# Patient Record
Sex: FEMALE | Race: WHITE | NOT HISPANIC OR LATINO | URBAN - METROPOLITAN AREA
[De-identification: names, ages, dates, MRNs, and addresses within clinical notes are randomized per-mention and may not be internally consistent; named-entity substitution may affect disease eponyms.]

---

## 2019-09-07 ENCOUNTER — EMERGENCY (EMERGENCY)
Facility: HOSPITAL | Age: 54
LOS: 0 days | Discharge: HOME | End: 2019-09-07
Attending: EMERGENCY MEDICINE | Admitting: EMERGENCY MEDICINE
Payer: COMMERCIAL

## 2019-09-07 VITALS
RESPIRATION RATE: 20 BRPM | DIASTOLIC BLOOD PRESSURE: 87 MMHG | TEMPERATURE: 98 F | OXYGEN SATURATION: 97 % | HEART RATE: 91 BPM | SYSTOLIC BLOOD PRESSURE: 164 MMHG

## 2019-09-07 DIAGNOSIS — R09.89 OTHER SPECIFIED SYMPTOMS AND SIGNS INVOLVING THE CIRCULATORY AND RESPIRATORY SYSTEMS: ICD-10-CM

## 2019-09-07 DIAGNOSIS — K21.9 GASTRO-ESOPHAGEAL REFLUX DISEASE WITHOUT ESOPHAGITIS: ICD-10-CM

## 2019-09-07 DIAGNOSIS — R07.0 PAIN IN THROAT: ICD-10-CM

## 2019-09-07 DIAGNOSIS — I10 ESSENTIAL (PRIMARY) HYPERTENSION: ICD-10-CM

## 2019-09-07 PROCEDURE — 99282 EMERGENCY DEPT VISIT SF MDM: CPT

## 2019-09-07 NOTE — ED PROVIDER NOTE - PATIENT PORTAL LINK FT
You can access the FollowMyHealth Patient Portal offered by [facility] by registering at the following website: [email or web address]. By joining [employer]’s FollowMyHealth portal, you will also be able to view your health information using other applications (apps) compatible with our system.

## 2019-09-07 NOTE — ED PROVIDER NOTE - CLINICAL SUMMARY MEDICAL DECISION MAKING FREE TEXT BOX
Patient presented with foreign body sensation in her throat. tolerating po, no difficulty breathing. ENT evaluated patient and had normal scope. Discharged with pmd follow up. Return precautions discussed.

## 2019-09-07 NOTE — CONSULT NOTE ADULT - SUBJECTIVE AND OBJECTIVE BOX
Pt is a 54 y.o female who presented with FB sensation. PT states she was eating breakfast sausage this morning, was talking to her son and noted the food went in the "wrong way". Felt like she was choking, but then resolved. PT states she was breathing fine, just noticed her voice was a little different - pt thought she scratched her throat. Pt was able to drink easily afterwards. PT went out for a walk and all of a sudden felt something "move" in her throat and felt her throat close up. PT currently denies any SOB/diff breathing. Pt states she has had to clear her throat multiple times, no resolution of symptoms. Does not feel sensation every time she swallows.    PAST MEDICAL & SURGICAL HISTORY:  Epilepsy  GERD (gastroesophageal reflux disease)  Hypertension  Home Medications:  Allergies    No Known Allergies    Intolerances    Vital Signs Last 24 Hrs  T(C): 36.4 (07 Sep 2019 15:34), Max: 36.4 (07 Sep 2019 15:34)  T(F): 97.5 (07 Sep 2019 15:34), Max: 97.5 (07 Sep 2019 15:34)  HR: 91 (07 Sep 2019 15:34) (91 - 91)  BP: 164/87 (07 Sep 2019 15:34) (164/87 - 164/87)  BP(mean): --  RR: 20 (07 Sep 2019 15:34) (20 - 20)  SpO2: 97% (07 Sep 2019 15:34) (97% - 97%)    GEN: NAD, awake and alert. no drooling or pooling of secretions. no respiratory distress, no stridor or stertor. good vocal quality.  HEENT: NC/AT; oral mucosa pink. no erythema/edema to post pharynx, uvula midline. neck supple.     Fiberoptic Laryngoscopy: airway patent, diffuse non specific erythema. no FB noted. TVC mobile and intact.

## 2019-09-07 NOTE — ED ADULT TRIAGE NOTE - CHIEF COMPLAINT QUOTE
pt c/o eating sausage this morning, feels like it is still stuck in her throat. Pt denies SOB or drooling. Pt speaking in full sentences.

## 2019-09-07 NOTE — ED ADULT TRIAGE NOTE - MEANS OF ARRIVAL
Per provider patient picked up Pharmacy Assistance Program medication. Medication: Tribenzor 40/10/25 mg (90 tablets)  : [employer]   Lot  [id number]         Exp 05/2019    Medication: Lantus 5mL prefilled pens 100 units/mL (10 pens)  : [employer]     Lot  [id number]         Exp 04/2020    Medication: Trulicity 1.05UR/5.6YV (20 pens)  [employer]   Lot  [id number]         Exp 02/2019    Patient verified understanding of medication and directions. Discharge instructions reviewed with patient. Medication list and understanding of medications reviewed with patient. OTC and herbal medications reviewed and added to med list if applicable  Barriers to adherence assessed.     Guidance given regarding new medications this visit, including reason for taking this medicine, and common side effects
ambulatory

## 2019-09-07 NOTE — ED ADULT NURSE NOTE - OBJECTIVE STATEMENT
c/o "I was eating sausage this morning and I choked on a piece. I feel like something is stuck in my throat." no fever/ chills/ sore throat

## 2019-09-07 NOTE — ED PROVIDER NOTE - CARE PROVIDERS DIRECT ADDRESSES
[email or web address]

## 2019-09-07 NOTE — ED PROVIDER NOTE - CARE PROVIDER_API CALL
Iftikhar Reyna)  Otolaryngology  [address] [place] [address], [address] [place] [address]  Phone: [phone]  Fax: [phone]  Follow Up Time:

## 2019-09-07 NOTE — ED PROVIDER NOTE - ATTENDING CONTRIBUTION TO CARE
53 yo F pmh of gerd, htn, epilepsy presents after episodes of chocking. State that she was eating sausage this morning and felt like she was chocking. Sensation resolved but felt like something was still in her throat. Went for a walk, then had an episode like she felt her throat spasm so came to the ed. No throat swelling, no trouble swallowing, able to tolerate drinking fluids.     CONSTITUTIONAL: Well-developed; well-nourished; in no acute distress.   SKIN: warm, dry  HEAD: Normocephalic; atraumatic.  EYES: PERRL, EOMI, no conjunctival erythema  ENT: No nasal discharge; airway clear. no pharyngeal erythema or edema.   NECK: Supple; non tender.  CARD: S1, S2 normal;  Regular rate and rhythm.   RESP: No wheezes, rales or rhonchi.

## 2019-09-07 NOTE — ED PROVIDER NOTE - OBJECTIVE STATEMENT
53 y/o female with hx HTN, GERD, Epilepsy presents to the ED c/o "I was eating sausage this morning and I choked on a piece. I feel like something is stuck in my throat." no fever/ chills/ sore throat

## 2019-09-07 NOTE — ED PROVIDER NOTE - NSFOLLOWUPINSTRUCTIONS_ED_ALL_ED_FT
Swallowed Foreign Body, Adult    A swallowed foreign body means that you swallow something and it gets stuck. It might be food or something else. The object may get stuck in the tube that connects your throat to your stomach (esophagus), or it may get stuck in another part of your belly (digestive tract). It is very important to tell your doctor what you have swallowed.    Sometimes, the object will pass through your body on its own. Sometimes, the object will pass after you are given a medicine to relax your throat. The object may need to be taken out by a doctor if it is dangerous or if it will not pass through your body on its own. An object may need to be removed with surgery if:  It gets stuck in your throat.  You cannot swallow.  You cannot breathe well.  It is sharp.  It is harmful or poisonous (toxic), like batteries or drugs.  Follow these instructions at home:  Eat what you normally eat if your doctor says that you can.  Keep checking your poop (stool) to see if the object has come out of your body.  Call your doctor if the object has not come out of your body after 3 days.  If you had surgery (endoscopy) to remove the object:  Care for yourself after surgery as told by your doctor.  Keep all follow-up visits as told by your doctor. This is important.    Contact a doctor if:  You still have problems after you have been treated.  The object has not come out of your body after 3 days.  Get help right away if:  You have a fever.  You have pain in your chest or your belly.  You cough up blood.  You have blood in your poop or your throw up (vomit).  This information is not intended to replace advice given to you by your health care provider. Make sure you discuss any questions you have with your health care provider.

## 2019-09-07 NOTE — CONSULT NOTE ADULT - ASSESSMENT
54 y.o female who p/w FB sensation (sausage) - no FB on FFL    ·	PPI  ·	soft, room temp diet  ·	no acute intervention  ·	w/d with attng

## 2021-08-03 ENCOUNTER — OUTPATIENT (OUTPATIENT)
Dept: OUTPATIENT SERVICES | Facility: HOSPITAL | Age: 56
LOS: 1 days | Discharge: HOME | End: 2021-08-03

## 2021-08-03 PROBLEM — I10 ESSENTIAL (PRIMARY) HYPERTENSION: Chronic | Status: ACTIVE | Noted: 2019-09-07

## 2021-08-03 PROBLEM — G40.909 EPILEPSY, UNSPECIFIED, NOT INTRACTABLE, WITHOUT STATUS EPILEPTICUS: Chronic | Status: ACTIVE | Noted: 2019-09-07

## 2021-08-03 PROBLEM — K21.9 GASTRO-ESOPHAGEAL REFLUX DISEASE WITHOUT ESOPHAGITIS: Chronic | Status: ACTIVE | Noted: 2019-09-07

## 2021-08-04 DIAGNOSIS — Z13.820 ENCOUNTER FOR SCREENING FOR OSTEOPOROSIS: ICD-10-CM

## 2021-08-04 DIAGNOSIS — Z78.0 ASYMPTOMATIC MENOPAUSAL STATE: ICD-10-CM

## 2021-08-04 DIAGNOSIS — M89.9 DISORDER OF BONE, UNSPECIFIED: ICD-10-CM
